# Patient Record
Sex: MALE | Race: WHITE | NOT HISPANIC OR LATINO | Employment: FULL TIME | URBAN - METROPOLITAN AREA
[De-identification: names, ages, dates, MRNs, and addresses within clinical notes are randomized per-mention and may not be internally consistent; named-entity substitution may affect disease eponyms.]

---

## 2023-04-24 LAB — HBA1C MFR BLD HPLC: 5 %

## 2024-03-05 ENCOUNTER — OFFICE VISIT (OUTPATIENT)
Dept: GASTROENTEROLOGY | Facility: CLINIC | Age: 59
End: 2024-03-05
Payer: COMMERCIAL

## 2024-03-05 VITALS
HEIGHT: 70 IN | DIASTOLIC BLOOD PRESSURE: 78 MMHG | BODY MASS INDEX: 25.48 KG/M2 | WEIGHT: 178 LBS | SYSTOLIC BLOOD PRESSURE: 122 MMHG

## 2024-03-05 DIAGNOSIS — Z12.11 COLON CANCER SCREENING: ICD-10-CM

## 2024-03-05 DIAGNOSIS — Z91.09 SENSITIVITY TO SUNLIGHT: ICD-10-CM

## 2024-03-05 DIAGNOSIS — K50.80 CROHN'S DISEASE OF BOTH SMALL AND LARGE INTESTINE WITHOUT COMPLICATION (HCC): Primary | ICD-10-CM

## 2024-03-05 DIAGNOSIS — Z86.19 HISTORY OF STAPH INFECTION: ICD-10-CM

## 2024-03-05 DIAGNOSIS — K21.9 GASTROESOPHAGEAL REFLUX DISEASE, UNSPECIFIED WHETHER ESOPHAGITIS PRESENT: ICD-10-CM

## 2024-03-05 DIAGNOSIS — Z85.828 HISTORY OF BASAL CELL CANCER: ICD-10-CM

## 2024-03-05 PROCEDURE — 99204 OFFICE O/P NEW MOD 45 MIN: CPT | Performed by: INTERNAL MEDICINE

## 2024-03-05 RX ORDER — ADALIMUMAB 40MG/0.4ML
KIT SUBCUTANEOUS
COMMUNITY
Start: 2024-02-13

## 2024-03-05 RX ORDER — OMEPRAZOLE 40 MG/1
CAPSULE, DELAYED RELEASE ORAL
COMMUNITY
Start: 2023-12-21

## 2024-03-05 RX ORDER — MELOXICAM 15 MG/1
15 TABLET ORAL DAILY
COMMUNITY
Start: 2023-12-05

## 2024-03-05 NOTE — PATIENT INSTRUCTIONS
[facility] - Outpatient Consultation  Johnathon Perera 58 y.o. male MRN: [id number]  Encounter: [id number]    ASSESSMENT AND PLAN:      1. Crohn's disease of both small and large intestine without complication (HCC)  -- Patient diagnosed with Crohn's disease in the last 2 to 3 years.  Sounds like he has had ileocolonic Crohn's.  Has had excellent response to Humira but has had some ongoing side effects  -Will continue Humira for now.  -In light of adverse effects consider changing to an alternative biologic such as Entyvio or Stelara.  -Will check old records and confer with Dr. Lr and Costa    --Patient does take Mobic occasionally for joint pains.  Best to take Tylenol and avoid NSAIDs    2. Colon cancer screening  --Up-to-date with colonoscopies.  Has had within the last year    3. Sensitivity to sunlight  --Patient noticed increasing sun sensitivity since being on Humira    4. History of staph infection  --Patient requiring antibiotics for skin infection in recent past.  Does have concerns about immunosuppressive effect of medication    5. History of basal cell cancer  -Recent Mohs surgery    6.  GERD-patient on omeprazole as needed.  Has had symptoms of dysphagia in the past.  Review records from previous endoscopy      Followup Appointment:  pending

## 2024-03-05 NOTE — PROGRESS NOTES
[facility] - Outpatient Consultation  Johnathon Perera 58 y.o. male MRN: [id number]  Encounter: [id number]    ASSESSMENT AND PLAN:      1. Crohn's disease of both small and large intestine without complication (HCC)  -- Patient diagnosed with Crohn's disease in the last 2 to 3 years.  Sounds like he has had ileocolonic Crohn's.  Has had excellent response to Humira but has had some ongoing side effects  -Will continue Humira for now.  -In light of adverse effects consider changing to an alternative biologic such as Entyvio or Stelara.  -Will check old records and confer with   and Costa    --Patient does take Mobic occasionally for joint pains.  Best to take Tylenol and avoid NSAIDs    2. Colon cancer screening  --Up-to-date with colonoscopies.  Has had within the last year    3. Sensitivity to sunlight  --Patient noticed increasing sun sensitivity since being on Humira    4. History of staph infection  --Patient requiring antibiotics for skin infection in recent past.  Does have concerns about immunosuppressive effect of medication    5. History of basal cell cancer  -Recent Mohs surgery    6.  GERD-patient on omeprazole as needed.  Has had symptoms of dysphagia in the past.  Review records from previous endoscopy      Followup Appointment:  pending ______________________________________________________________________    Chief Complaint   Patient presents with    Consult     New pt establish care, also discuss side affects from Humira       HPI:   Johnathon Perera is a 58 y.o. year old male who presents to the office seeking second opinion with respect to management of his Crohn's disease.  Patient began having symptoms of lower abdominal pain and diarrhea about 3 years ago.  It took him about 8 months but eventually was diagnosed with what sounds like ileocolonic Crohn's disease by colonoscopy.  Patient also had CAT scan examination.  He was initially treated with  "oral budesonide which was partially effective.  Eventually he was placed on Humira with good results with respect to his Crohn's.  He is not having bleeding diarrhea or abdominal pain.  He feels as though his Crohn's disease is \"in remission.\"  Patient does have concerns about effects of his medication.  In the past year he had a staph infection which required Lalan antibiotics.  This involves his face.  Patient also was diagnosed with basal cell CA and had recent Mohs surgery.  Lastly patient reports since being on Humira being increasingly sensitive to the sun.  He wants to know if there were alternatives to the Humira that may be as effective.  Patient is up-to-date with respect to screening.  He has had colonoscopies within the last year or so.  He does have occasional knee pain and joint pain.  He did have rashes some times predating his use of the marrow.  He does take meloxicam on occasion for his arthritis pains  Lastly patient does have a history of gastroesophageal reflux.  He is on omeprazole with good results.  He did have an upper endoscopy previously.  He has had previous history of some solid food dysphagia but this is infrequent.  Will have to be careful he eats things like dried chicken or spaghetti.    Historical Information   Past Medical History:   Diagnosis Date    Basal cell adenocarcinoma     Colon polyp     Crohn's disease (HCC) 10/22/2022    Staphylococcal infection      Past Surgical History:   Procedure Laterality Date    COLONOSCOPY      MOHS SURGERY       Social History     Substance and Sexual Activity   Alcohol Use Yes    Alcohol/week: 4.0 standard drinks of alcohol    Types: 4 Standard drinks or equivalent per week     Social History     Substance and Sexual Activity   Drug Use Never     Social History     Tobacco Use   Smoking Status Never   Smokeless Tobacco Never     Family History   Problem Relation Age of Onset    Diabetes Mother     COPD Father     Crohn's disease Maternal " "Grandmother         Zunilda       Meds/Allergies     Current Outpatient Medications:     Humira, 2 Pen, 40 MG/0.4ML PNKT    meloxicam (MOBIC) 15 mg tablet    omeprazole (PriLOSEC) 40 MG capsule    Allergies   Allergen Reactions    Penicillins Rash       PHYSICAL EXAM:    Blood pressure 122/78, height 5' 10\" (1.778 m), weight 80.7 kg (178 lb). Body mass index is 25.54 kg/m².  General Appearance: NAD, cooperative, alert  Eyes: Anicteric, conjunctiva pink   ENT:  Normocephalic, atraumatic, normal mucosa.    Neck:  Supple, symmetrical, trachea midline,   Resp:  Clear to auscultation bilaterally; no rales, rhonchi or wheezing; respirations unlabored   CV:  S1 S2, Regular rate and rhythm; no murmur, rub, or gallop.  GI:  Soft, non-tender, non-distended; normal bowel sounds; no masses, no organomegaly   Rectal: Deferred  Musculoskeletal: No cyanosis, clubbing or edema. Normal ROM.  Skin:  No jaundice, rashes, or lesions   Heme/Lymph: No palpable cervical lymphadenopathy  Psych: Normal affect, good eye contact  Neuro: No gross deficits, AAOx3      Lab studies [facility] August 2023 CBC, CBC, CRP all normal.  -Patient reports \"good\" adalimumab levels on recent labs    REVIEW OF SYSTEMS:    CONSTITUTIONAL: Denies any fever, chills, rigors, and weight loss.  HEENT: No earache or tinnitus. Denies hearing loss or visual disturbances.  CARDIOVASCULAR: No chest pain or palpitations.   RESPIRATORY: Denies any cough, hemoptysis, shortness of breath or dyspnea on exertion.  GASTROINTESTINAL: As noted in the History of Present Illness.   GENITOURINARY: No problems with urination. Denies any hematuria or dysuria.  NEUROLOGIC: No dizziness or vertigo, denies headaches.   MUSCULOSKELETAL: Denies any muscle or joint pain.   SKIN: Denies skin rashes or itching.   ENDOCRINE: Denies excessive thirst. Denies intolerance to heat or cold.  PSYCHOSOCIAL: Denies depression or anxiety. Denies any recent memory loss.     "

## 2024-04-10 ENCOUNTER — TELEPHONE (OUTPATIENT)
Dept: GASTROENTEROLOGY | Facility: CLINIC | Age: 59
End: 2024-04-10

## 2024-04-10 NOTE — TELEPHONE ENCOUNTER
My apologies for not getting back to you --  I was waiting for your records to get scanned into your chart so I could review before calling my colleague so I could have all the information available.  Unfortunately I never received those records-I believe I asked the  to have you sign for them so we can sign for the records if not we can do this at this time.  So sorry for the mixup--pretty important issue as more lore days are in the near future- will try to correct -Do you remember signing a record release at the office ?  Will ask staff to ask for  records for [facility] / Dr. Rivera and procedure and path notes      AILYN

## 2024-04-15 ENCOUNTER — TELEPHONE (OUTPATIENT)
Dept: GASTROENTEROLOGY | Facility: CLINIC | Age: 59
End: 2024-04-15

## 2024-04-15 NOTE — TELEPHONE ENCOUNTER
----- Message from Demarcus Beebe MD sent at 4/15/2024 10:21 AM EDT -----  Regarding: med records for [facility] and [facility]     I am not sure who to send this to but can someone get these records for me soon for me to review--  thanks so much    AILYN

## 2024-04-15 NOTE — TELEPHONE ENCOUNTER
Lvm for [facility] office to call with information about obtaining pt's records for Dr Beebe's review

## 2024-04-17 NOTE — TELEPHONE ENCOUNTER
Maritza from [facility] called. Call was transferred to [facility].

## 2024-04-17 NOTE — TELEPHONE ENCOUNTER
Dr Nunez was finally able to get the pt's medical records from Dr Rivera's office. They are scanned in for your review under the Media tab   No

## 2024-05-08 ENCOUNTER — HOSPITAL ENCOUNTER (OUTPATIENT)
Dept: HOSPITAL 99 - RAD | Age: 59
End: 2024-05-08
Payer: COMMERCIAL

## 2024-05-08 DIAGNOSIS — M54.42: Primary | ICD-10-CM

## 2024-05-21 ENCOUNTER — CONSULT (OUTPATIENT)
Dept: GASTROENTEROLOGY | Facility: CLINIC | Age: 59
End: 2024-05-21
Payer: COMMERCIAL

## 2024-05-21 VITALS
HEIGHT: 70 IN | WEIGHT: 175 LBS | DIASTOLIC BLOOD PRESSURE: 75 MMHG | BODY MASS INDEX: 25.05 KG/M2 | TEMPERATURE: 98.2 F | HEART RATE: 50 BPM | OXYGEN SATURATION: 99 % | SYSTOLIC BLOOD PRESSURE: 131 MMHG

## 2024-05-21 DIAGNOSIS — K50.80 CROHN'S DISEASE OF BOTH SMALL AND LARGE INTESTINE WITHOUT COMPLICATION (HCC): Primary | ICD-10-CM

## 2024-05-21 PROBLEM — K52.9 IBD (INFLAMMATORY BOWEL DISEASE): Status: ACTIVE | Noted: 2024-05-21

## 2024-05-21 PROCEDURE — 99215 OFFICE O/P EST HI 40 MIN: CPT | Performed by: INTERNAL MEDICINE

## 2024-05-21 RX ORDER — TADALAFIL 5 MG/1
TABLET ORAL
COMMUNITY
Start: 2024-03-21

## 2024-05-21 NOTE — PROGRESS NOTES
Gastroenterology Outpatient Follow-up - Crohn's Disease  Johnathon Perera 58 y.o. male MRN: [id number]  Encounter: [id number]    Johnathon Perera is a 58 y.o. male with Ulcerative colitis.    Symptom onset:  2020  Diagnosis:  ulcerative colitis  Year of diagnosis:  2021    IBD Summary: Johnathon Perera has pancolitis diagnosed in 2020.  Initial diagnosis was Crohn's disease, but I think that he may have UC.  He has been in clinical and endoscopic remission with adalimumab for about 2 years.  He has history of basal cell carcinoma.  He is seeing me in 2024 for second opinion.      Ulcerative Colitis Summary  Macroscopic extent of diseases: extensive ulcerative colitis  Microscopic extent of diseases:  pancolitis  Has the patient ever been hospitalized for severe disease: No        Surgical History  Number of IBD surgeries: 0      First IBD surgery:    Most Recent IBD surgery:    Esophageal:  0    Gastroduodenal:  0    Small bowel resection(s):  0    Ileocolonic resection(s): 0      Colonic resection(s):  0    Ileostomy or colostomy:  no previous ostomy    Complete colectomy:  No         Medications     Year last used Reason for discontinuation   Corticosteroids prior   2021 [place]     Thiopurines   never         Methotrexate   never         Infliximab   never         Adalimumab prior     CR     Certolizumab   never         Golimumab   never         Natalizumab   never         Mesalamine   never         Sulfasalzine   never         Vedolizumab   never         Ustekinumab   never         Tofacitinib   never         Other biologic   never             Extraintestinal Manifestations    IBD-associated arthropathy Yes  Joint pain at time of diagnosis    Uveitis      Oral aphthous ulcers No   Erythema nodosum No    Pyoderma gangrenosum No    Primary sclerosing cholangitis No    Thrombotic complications No          Cancer / Dysplasia History  History of IBD-associated dysplasia: none    Date of diagnosis (Year):     History of  colorectal cancer: No   History of cervical dysplasia: No   History of skin cancer: basal cell carcinoma         Laboratory Data   Most recent (date) Result   PPD       Quanitferon gold 8/4/2023 negative   TPMT   unknown   Hepatitis A   unknown   HBsAb 8/4/2023 negative   HBcAb 8/4/2023 negative   HBsAg 8/4/2023 negative   HCV Ab   unknown       Imaging / Diagnostic Procedures   Most recent (date) Findings   Colonoscopy or sigmoidoscopy #1 5/28/2021            Ulcers/Erosions  small           Strictures  none           Stricture Severity  N/A           Endoscopic Score Terrazas Score:  2 Rutgeert's:               Findings  1.  5 mm sessile polyp in the rectum.  2.  Normal mucosa in the proximal ascending colon and cecum.  3.  Inflammation characterized by edema, erythema, granularity, and aphthous ulcers were found in continuous and circumferential pattern from the sigmoid colon to the ascending colon.  This was moderate in severity.  4.  The rectum, the rectosigmoid colon and distal sigmoid colon the proximal ascending colon and the cecum were spared.           Pathology  A.  Polyp: Tubular adenoma.  B.  TI: Mild focally active ileitis.  Negative for granulomas or dysplasia.  C.  70 cm: Mild to moderate chronic active colitis.   D.  60 cm: Mild chronic active colitis.  E.  50 cm: Moderate chronic active colitis.  F.   40 cm: Mild chronic active colitis.  G.  30 cm: Mild acute colitis.  H.  20 cm: Focal mild increased chronic inflammation.  I.   10 cm colon colon focal mild acute colitis.   Colonoscopy or sigmoidoscopy #2 2/21/2022            Ulcers/Erosions  no erosions              Strictures  none              Stricture Severity  N/A           Endoscopic Score Terrazas Score:  0 Rugeert's:  N/A           Findings  1.  7 mm sessile polyp in the transverse colon.  2.  Normal mucosa in the entire colon.  Biopsies taken.  3. Normal terminal ileum.  Biopsies taken.  PATH:  A.  Polyp: polypoid low-grade dysplasia.  B.  TI:  Focal cryptitis.  C.  Colon: Quiescent colitis, negative for dysplasia.           Pathology      EGD       Small bowel follow-through       CT enterography       CT without enterography       MRI enterography       Capsule study       DEXA scan   Lowest Z-score:      CXR (for TB)           HPI:   Interval History:  5/20/2024 Follow up  I am seeing Johnathon Dunbarradha for 2nd opinion.  He was diagnosed with ileocolonic Crohn's disease in 5/2021.  His symptoms started in 2020 and consisted of lower abdominal pain, loose stools, and bleeding. He recalls that his symptoms started shortly after he was diagnosed with COVID.  His colonoscopy from May 2021 showed normal mucosa in the right colon and normal mucosa in the rectum, with inflammation of the sigmoid and transverse and ascending colon.  However, biopsies showed pancolitis.  The TI appeared normal but there was mild active ileitis on biopsies.  He was treated with prednisone and improved, then took budesonide but flaed again. Adalimumab was started in the Fall of 2021 and he has been on remission since with standard dosing.  Repeat colonoscopy in 2/2022 showed endoscopic remission.     For the most, he is happy with adalimumab, but wants to discuss some concerns. First, about a year ago he developed a macular rash on the face and saw a dermatologist. He says that skin swab was positive for MRSA and he was treated with an antibiotic and the rash resolved.  Second, 6 months ago, he was diagnosed with basal cell carcinoma on the face and had Mohs surgery.  Third, he feels that the skin on his face has become photosensitive and turns red when he is out in the sun, then becomes normal again.  He wants to know whether adalimumab might be responsible for these skin issues.      He reports that, until a week ago, his bowel function was at baseline.  However, approximately a week ago he noticed some loose stools.  Frequency has increased from 1-3 bowel movements per day.  There  "is no diarrhea and there is no bleeding.  He says that with the soft stools, he feels that he is BMs are incomplete.    He does not get COVID or flu shots.  He has chronic shoulder, elbow, and knee pain.  He cannot recall the timing of this, but thinks that it started around the same time as his IBD diagnosis.  He has not in rheumatology.    Labs from 5/9/24:  Cr 1.23, LFTs normal, Hgb 14.3, MCV 87.9, Plt 234    Johnathon reports the following symptoms over the last 7 days:    Stool Frequency 1-2 stools/day more than normal   Average stools per day: 3   Average liquid stools per day: 0   Consistency of bowel: soft or semi-formed   Awakening from sleep to move bowels? No   Urgency no fecal urgency   Visible blood in stool? none   Abdominal pain? none   General Wellbeing? generally well     Johnathon also reports the following symptoms in the last month:    Leakage of stool while sleeping? No   Leakage of stool while awake? No       REVIEW OF SYSTEMS:  During the last 7 days, Johnathon experienced the following symptoms:  Unintentional Weight Loss (in the last month) No   Fever No  Comment:night sweats   Eye irritation No   Mouth sores No   Sore throat No   Chest pain No   Shortness of breath No   Numbness or tingling in hands or feet No   Skin rash No   Pain or swelling in joints Yes   Bruising or bleeding No   Felt depressed or blue No   Fatigue Yes   Dysuria No   Please see HPI for additional pertinent review of systems; otherwise remainder of ROS was unremarkable    MEDICATIONS:    Current Outpatient Medications:     Humira, 2 Pen, 40 MG/0.4ML PNKT    meloxicam (MOBIC) 15 mg tablet    omeprazole (PriLOSEC) 40 MG capsule    tadalafil (CIALIS) 5 MG tablet    ALLERGIES:  Allergies   Allergen Reactions    Penicillins Rash       OBJECTIVE:  /75 (BP Location: Left arm, Patient Position: Sitting, Cuff Size: Large)   Pulse (!) 50   Temp 98.2 °F (36.8 °C) (Tympanic)   Ht 5' 10\" (1.778 m)   Wt 79.4 kg (175 lb)   SpO2 " "99%   BMI 25.11 kg/m²      PHYSICAL EXAM:    General Appearance:   Alert, cooperative, no distress   HEENT:   Normocephalic, atraumatic, anicteric.     Neck:  Supple, symmetrical, trachea midline   Lungs:   Clear to auscultation bilaterally; no rales, rhonchi or wheezing; respirations unlabored    Heart::   Regular rate and rhythm; no murmur, rub, or gallop.   Abdomen:   Soft, non-distended; normal bowel sounds    Abdominal exam was notable for no tenderness with no mass.     Genitalia:   Deferred    Rectal:   Perirectal assessment was not performed.                     Extremities:  No cyanosis, clubbing or edema    Pulses:  2+ and symmetric    Skin:  No jaundice, rashes, or lesions    Lymph nodes:  No palpable cervical lymphadenopathy      No results found for: \"WBC\", \"HGB\", \"HCT\", \"MCV\", \"PLT\"  No results found for: \"NA\", \"SODIUM\", \"K\", \"CL\", \"CO2\", \"ANIONGAP\", \"AGAP\", \"BUN\", \"CREATININE\", \"GLUC\", \"GLUF\", \"CALCIUM\", \"AST\", \"ALT\", \"ALKPHOS\", \"PROT\", \"TP\", \"BILITOT\", \"TBILI\", \"EGFR\"  No results found for: \"CRP\"  No results found for: \"ODTPOZFM54\"  No results found for: \"FERRITIN\"    ASSESSMENT AND PLAN:    Johnathon has a history of macroscopic extensive  and microscopic pancolitis  ulcerative colitis diagnosed in 2021. Current medical therapy is with adalimumab 40 mg every 2 weeks. My global assessment is that the clinical disease is currently quiescent.     The 6-point Terrazas score was 1 and the 9-point Terrazas score was 1.  The short CDAI was 44.      Johnathon Perera has a diagnosis of ileocolonic Crohn's disease. His initial colonoscopy from 2021 showed segmental colitis, but he had pan-colitis histologically. Therefore, I wonder whether he has UC with backwash ileitis.  In any case, he is doing well on adalimumab currently and is in clinical and endoscopic remission.     We discussed his concerns re. adalimumab and skin issues, specifically staph infection, basal cell carcinoma, and sun sensitivity. To my knowledge, " these are not typical side effects of adalimumab. The staph infection, in particular, sounds strange because he describes a uniform macular rash, rather than pustules as would be expected for impetigo. I wonder whether the skin culture simply grew out normal benjamín. Re. basal cell carcinoma, there is no data to suggest that adalimumab increases the risk.  Re. sun sensitivity, I have not encountered this before, but I cannot completely rule out a medication side effect.    My advice is to stick with adalimumab for now and discuss the skin concerns with his dermatologist.  I explained that we can easily switch to a different biologic, but there is always the risk that the next biologic may not work as well as adalimumab.    1. He will stay on adalimumab 40 mg every 2 weeks for now.  2. Given loose stools for the past week, check calprotectin.  3. Regular Dermatology follow up.  4. Vaccinations as discussed below.  5. I am happy to see him again and readdress adalimumab concerns.        Health Maintenance Recommendations:  Vaccines & Infections  COVID-19 vaccination and boosters are recommended. There is no evidence that the COVID-19 vaccine would cause an IBD flare.  Avoid live vaccines if on immunosuppressive therapy.  Yearly influenza vaccine (flu shot).  Pneumonia vaccines for patients on immunosuppression. These include Prevnar 20, followed by Pneumovax 23 at least 8 weeks later.  Shingrix vaccine (series of 2 injections) for al patients 65 and older. Patients on tofacitinib or upadacitinib should be vaccinated regardless of age.  If not immune to measles mumps or rubella, MMR vaccine is recommended. However, this is a live vaccine and should be given prior to immunosuppressive therapy.  HPV vaccination as per national guidelines.  Hepatitis A and B vaccinations if not previously vaccinated.  Testing for tuberculosis with QuantiFERON Gold blood test and/or chest xray prior to starting immunosuppressive medications,  and then annually    Cancer screening  Dysplasia surveillance for colorectal cancer. Colonoscopy in all patients with extensive colitis (more than 1/3 of the colon involved) who had disease for at least 8 or more years.  Repeat colonoscopy approximately every 12-24 months.  In patients with concurrent primary sclerosing cholangitis, history of dysplasia, or family history of colon cancer, repeat colonoscopy annually.    Females: Pap smear annually for woman on immunosuppression.  Annual dermatologic/skin exam in all patients with IBD, especially those on immunosuppression with thiopurines or MARY KAY inhibitors.    Miscellaneous  DEXA scan, once off steroids for 3 months  Depression screening recommended annually  Routine dental and ophthalmology examinations    Problem List Items Addressed This Visit    None  Visit Diagnoses       Crohn's disease of both small and large intestine without complication (HCC)    -  Primary    Relevant Orders    Calprotectin,Fecal            Vesselin MD Costa

## 2024-05-21 NOTE — PATIENT INSTRUCTIONS
High Fiber Diet   WHAT YOU NEED TO KNOW:   A high-fiber diet includes foods that have a high amount of fiber. Fiber is the part of fruits, vegetables, and grains that is not broken down by your body. Fiber keeps your bowel movements regular. Fiber can also help lower your cholesterol level, control blood sugar in people with diabetes, and relieve constipation. Fiber can also help you control your weight because it helps you feel full faster. Most adults should eat 25 to 35 grams of fiber each day. Talk to your dietitian or healthcare provider about the amount of fiber you need.  DISCHARGE INSTRUCTIONS:   Good sources of fiber:       Foods with at least 4 grams of fiber per serving:      ? to ½ cup of high-fiber cereal (check the nutrition label on the box)    ½ cup of blackberries or raspberries    4 dried prunes    1 cooked artichoke    ½ cup of cooked legumes, such as lentils, or red, kidney, and claudio beans    Foods with 1 to 3 grams of fiber per servin slice of whole-wheat, pumpernickel, or rye bread    ½ cup of cooked brown rice    4 whole-wheat crackers    1 cup of oatmeal    ½ cup of cereal with 1 to 3 grams of fiber per serving (check the nutrition label on the box)    1 small piece of fruit, such as an apple, banana, pear, kiwi, or orange    3 dates    ½ cup of canned apricots, fruit cocktail, peaches, or pears    ½ cup of raw or cooked vegetables, such as carrots, cauliflower, cabbage, spinach, squash, or corn  Ways that you can increase fiber in your diet:   Choose brown or wild rice instead of white rice.     Use whole wheat flour in recipes instead of white or all-purpose flour.     Add beans and peas to casseroles or soups.     Choose fresh fruit and vegetables with peels or skins on instead of juices.    Other diet guidelines to follow:   Add fiber to your diet slowly.  You may have abdominal discomfort, bloating, and gas if you add fiber to your diet too quickly.     Drink plenty of liquids  as you add fiber to your diet.  You may have nausea or develop constipation if you do not drink enough water. Ask how much liquid to drink each day and which liquids are best for you.    © Copyright [employer]ative 2023 Information is for End User's use only and may not be sold, redistributed or otherwise used for commercial purposes.  The above information is an  only. It is not intended as medical advice for individual conditions or treatments. Talk to your doctor, nurse or pharmacist before following any medical regimen to see if it is safe and effective for you.    Johnathon Perera  5/21/2024     Recommended Total Fiber Intake**    AGE  MEN  WOMAN    19-50  38 grams/day  25 grams/day    Over 50  30 grams/day  21 grams/day    Fiber Sources in Common Foods   Use this guide to find out if you have enough fiber in you diet.    Food  Size of Serving  Fiber Grams/Servings  Calories/   Serving  Food  Size of Serving  Fiber Grams/Servings  Calories/   Serving    Fruits: (raw unless otherwise noted  Vegetables: (cooked, unless otherwise noted)    Apple (w/peel)  1 medium  3.7  81  Artichoke  1 globe  6.5  60    Apricots  1 cup  3.7  74  Asparagus  ½ cup  1.8  25    Banana  1 medium  2.7  105  Beans:    Blackberries  1 cup  7.2  75  Green (canned)  ½ cup  1.3  14    Blueberries  1 cup  3.9  81  Kidney  ½ cup  5.7  114    Cantaloupe  1 cup  1.3  56  Lima  ½ cup  6.1  85    Grapefruit  1 medium  2.8  82  Daniels  ½ cup  7.4  118    Grapes  1 cup  1.6  114  White  ½ cup  5.5  122    Orange  1 medium  3.1  62  Beets  ½ cup  1.6  37    Pear (with peel)  1 medium  4.0  98  Broccoli  ½ cup  2.8  26    Pineapple  1 cup  1.9  76  Cabbage, green  ½ cup  2.1  16    Plums  1 medium  1.0  36  Cabbage, green (raw)  ½ cup  0.8  9    Prunes (dried)  1 cup  11.4  386  Carrots  ½ cup  2.6  35    Raspberries  1 cup  8.4  60  Cauliflower  ½ cup  2.0  17    Strawberries  1 cup  3.4  45  Cauliflower (raw)  ½ cup  1.3  13    Watermelon  1  slice  0.8  51  Celery (raw)  ½ cup  1.0  10    GRAIN PRODUCTS AND OTHERS:  Corn  ½ cup  2.0  66    Bread:  Cucumber (raw)  ½ cup  0.4  7    Kiswahili  1 slice  0.8  68  Eggplant  ½ cup  1.2  13    Rye  1 slice  1.6  67  Green Peas  ½ cup  4.4  62    White  1 slice  0.6  67  Lettuce, iceberg (raw)  ½ cup  0.4  4    Whole Wheat  1 slice  2.0  70  Onions (raw)  ½ cup  1.4  30    Cereal:  Potato (baked with skin)  ½ cup  1.5  66    Bran  1 ounce  9.7  70  Spinach  ½ cup  2.7  25    Corn Flakes  1 ounce  1.0  110  Tomato  ½ cup  1.0  19    Oat Bran  1 ounce  4.3  69  Zucchini  ½ cup  1.3  14    Oatmeal  1 ounce  3.0  109  METAMUCIL:    Shredded Wheat  1 ounce  2.8  102  Capsules  6 capsules  3.0  10    Crackers:  Smooth Texture Orange (sugar free)  1 tsp  3.0  20    Ken  1 square  0.1  27  Smooth Texture Orange (with sugar)  1 tbsp  3.0  45    Saltine  1 regular  0.1  13  Wafers  2 wafers  3.0  120    Rice:    Brown  ½ cup  1.8  108    White  ½ cup  0.3  103    Spaghetti  2 ounces  2.1  225    Almonds (roasted)  ½ cup  6.4  351    Peanuts (roasted)  ½ cup  6.1  388      ** [place] [employer], [employer] Sciences, 2002   Track your fiber intake for five days. Use the Fiber Source Guide to find out how much fiber is in common food.     If you’re not getting your recommended amount of fiber each day, talk to your doctor about how you can increase the fiber in your diet. Example  Monday Tuesday Wednesday Thursday Friday    Food  Oatmeal    Fiber Grams  2.8    Food  Blueberries    Fiber Grams  3.9    Food  W.W. Bread    Fiber Grams  1.9    Food  W.W. Bread    Fiber Grams  1.9    Food  Apple    Fiber Grams  3.7    Food  Spaghetti    Fiber Grams  .14    Food  Corn    Fiber Grams  2.0    Food  White Bread    Fiber Grams  .6    Food    Fiber Grams    Food    Fiber Grams    Food    Fiber Grams    Food    Fiber Grams    Food    Fiber Grams    Food    Fiber Grams    Food    Fiber Grams    Food    Fiber Grams     Food    Fiber Grams    Food    Fiber Grams    Food    Fiber Grams    Food    Fiber Grams    Add numbers in each column to find your daily fiber intake.    Total Daily Fiber Intake  18.2      Too Low - Like most Americans, this example is not enough fiber. Talk to your doctor about how to add fiber to your diet.   Quick Fiber Facts    Most Americans consume only about half of the recommended fiber they need each day.    Fiber helps maintain normal bowel function, and helps prevent constipation and its potential complications. Straining and pressure from constipation may lead to diverticular disease and hemorrhoids.    Stool softeners or stimulant laxatives only offer short-term relief of constipation, while dietary changes or fiber therapies help break the cycle of irregularity.    Diets low in saturated fat and cholesterol that include 7 grams of soluble fiber per day from psyllium husk, as in Metamucil, may reduce the risk of heart disease by lowering cholesterol. One adult dose of Metamucil has 2.4 grams of this soluable fiber.    Increase fiber intake gradually, giving the body time to adjust.

## 2024-06-14 LAB — CALPROTECTIN STL-MCNT: 44 MCG/G

## 2024-07-30 DIAGNOSIS — K21.9 GASTROESOPHAGEAL REFLUX DISEASE, UNSPECIFIED WHETHER ESOPHAGITIS PRESENT: Primary | ICD-10-CM

## 2024-07-30 RX ORDER — OMEPRAZOLE 40 MG/1
40 CAPSULE, DELAYED RELEASE ORAL DAILY
Qty: 30 CAPSULE | Refills: 3 | Status: SHIPPED | OUTPATIENT
Start: 2024-07-30

## 2024-08-14 DIAGNOSIS — K50.80 CROHN'S DISEASE OF BOTH SMALL AND LARGE INTESTINE WITHOUT COMPLICATION (HCC): Primary | ICD-10-CM

## 2024-08-14 NOTE — TELEPHONE ENCOUNTER
"Johnathon Perera   to [facility] (supporting Demarcus Beebe MD)   RD      8/10/24 10:36 AM  Ajay Beebe,      I am in need of a prescription refill for \"Humira (2 Pen) 40 MG/0.4ML Pnkt\". I took my last dose today. The Pharmacy, [facility], says that cannot send my next dose without a prescription update.      Thank you,      Minor Perera  August 12, 2024       CT    8/12/24  8:48 AM  Makenna Morgan MA routed this conversation to [facility]  Lizzie Musa, JENNIFER   to Me       8/12/24  9:01 AM  Looks as this would be his first refill with our GI group. Do we need a new authorization?  "

## 2024-08-15 NOTE — TELEPHONE ENCOUNTER
Medication: Humira 40mg Pen  Directions: inject 40mg every other week  Quantity: 2 pens  Day Supply: 28   Insurance: [employer]  How Prior Auth was submitted: Noel  Authorization Date range: 7/15/2024 - 8/14/2025  Authorization Number: [id number]  Pharmacy that fills med: [facility]

## 2024-08-16 NOTE — TELEPHONE ENCOUNTER
Called patient, reached voicemail, left message our office has sent a Socket Mobilehart message relaying pts requested medication refill has been sent to his pharmacy.

## 2024-08-19 NOTE — TELEPHONE ENCOUNTER
I spoke with the patient. Patients states he has spoken with [facility] and confirmed they received Humira prescription and pt requested medication to be expedited as [facility] stated it could take 5 days to process. Relayed to the patient, our office will also call [facility] to request expedited processing.     I called [facility] and spoke with Bob and requested Humira 40 mg/0.4ml prescription to be expedited. Bob received a reject product order service and has sent message to eligibility team to double check the PA on file. Provided Bob with PA Authorization Number: [id number]. Bob states submitted expedition to review and could take 2-3 business days to review.

## 2024-08-21 NOTE — TELEPHONE ENCOUNTER
Called [facility] and spoke with Shahla. This is in the final stages of processing. She is submitted request to expedite again.     Also, per [facility] it will be Adalimumab - RYESME (Simlandi) it is interchangeable with HumiraKaren Shabazz - can you please have pt follow up with [facility] tomorrow and let us know if he still cannot get medication. Also can you educate on Simlandi

## 2024-08-22 NOTE — TELEPHONE ENCOUNTER
I spoke with the patient. Patient aware to call [facility] today to set up shipment for Simlandi- discussed biosimilar Simlandi. Pt aware to call our office back if [facility] is unable to ship Simlandi.

## 2024-09-19 ENCOUNTER — OFFICE VISIT (OUTPATIENT)
Dept: GASTROENTEROLOGY | Facility: CLINIC | Age: 59
End: 2024-09-19
Payer: COMMERCIAL

## 2024-09-19 ENCOUNTER — TELEPHONE (OUTPATIENT)
Dept: GASTROENTEROLOGY | Facility: CLINIC | Age: 59
End: 2024-09-19

## 2024-09-19 VITALS
WEIGHT: 177 LBS | HEIGHT: 70 IN | DIASTOLIC BLOOD PRESSURE: 68 MMHG | SYSTOLIC BLOOD PRESSURE: 100 MMHG | BODY MASS INDEX: 25.34 KG/M2

## 2024-09-19 DIAGNOSIS — Z91.09 SENSITIVITY TO SUNLIGHT: ICD-10-CM

## 2024-09-19 DIAGNOSIS — Z85.828 HISTORY OF BASAL CELL CANCER: ICD-10-CM

## 2024-09-19 DIAGNOSIS — K50.80 CROHN'S DISEASE OF BOTH SMALL AND LARGE INTESTINE WITHOUT COMPLICATION (HCC): Primary | ICD-10-CM

## 2024-09-19 DIAGNOSIS — Z79.899 IMMUNOSUPPRESSION DUE TO DRUG THERAPY  (HCC): ICD-10-CM

## 2024-09-19 DIAGNOSIS — D84.821 IMMUNOSUPPRESSION DUE TO DRUG THERAPY  (HCC): ICD-10-CM

## 2024-09-19 DIAGNOSIS — D12.6 COLON ADENOMA: ICD-10-CM

## 2024-09-19 PROCEDURE — 99214 OFFICE O/P EST MOD 30 MIN: CPT | Performed by: INTERNAL MEDICINE

## 2024-09-19 NOTE — TELEPHONE ENCOUNTER
Scheduled date of colonoscopy (as of today):10-  Physician performing colonoscopy:Costa  Location of colonoscopy:[facility]   Bowel prep reviewed with patient:Aditya Crespo  Instructions reviewed with patient by:arvin  Clearances: no

## 2024-09-19 NOTE — PROGRESS NOTES
Gastroenterology Outpatient Follow-up - Ulcerative Colitis  Johnathon Perera 59 y.o. male MRN: [id number]  Encounter: [id number]    Johnathon Perera is a 59 y.o. male with Ulcerative colitis.    Symptom onset:  2020  Diagnosis:  ulcerative colitis  Year of diagnosis:  2021    IBD Summary: Johnathon Perera has pancolitis diagnosed in 2020.  Initial diagnosis was Crohn's disease, but I think that he may have UC.  He has been in clinical and endoscopic remission with adalimumab for about 2 years.  He has history of basal cell carcinoma.  He is seeing me in 2024 for second opinion.      Ulcerative Colitis Summary  Macroscopic extent of diseases: extensive ulcerative colitis  Microscopic extent of diseases:  pancolitis  Has the patient ever been hospitalized for severe disease: No        Surgical History  Number of IBD surgeries: 0      First IBD surgery:    Most Recent IBD surgery:    Esophageal:  0    Gastroduodenal:  0    Small bowel resection(s):  0    Ileocolonic resection(s): 0      Colonic resection(s):  0    Ileostomy or colostomy:  no previous ostomy    Complete colectomy:  No         Medications     Year last used Reason for discontinuation   Corticosteroids prior   2021 CO     Thiopurines   never         Methotrexate   never         Infliximab   never         Adalimumab prior     CR     Certolizumab   never         Golimumab   never         Natalizumab   never         Mesalamine   never         Sulfasalzine   never         Vedolizumab   never         Ustekinumab   never         Tofacitinib   never         Other biologic   never             Extraintestinal Manifestations    IBD-associated arthropathy Yes  Joint pain at time of diagnosis    Uveitis      Oral aphthous ulcers No   Erythema nodosum No    Pyoderma gangrenosum No    Primary sclerosing cholangitis No    Thrombotic complications No          Cancer / Dysplasia History  History of IBD-associated dysplasia: none    Date of diagnosis (Year):     History of  colorectal cancer: No   History of cervical dysplasia: No   History of skin cancer: basal cell carcinoma         Laboratory Data   Most recent (date) Result   PPD       Quanitferon gold 8/4/2023 negative   TPMT   unknown   Hepatitis A   unknown   HBsAb 8/4/2023 negative   HBcAb 8/4/2023 negative   HBsAg 8/4/2023 negative   HCV Ab   unknown       Imaging / Diagnostic Procedures   Most recent (date) Findings   Colonoscopy or sigmoidoscopy #1 5/28/2021            Ulcers/Erosions  small           Strictures  none           Stricture Severity  N/A           Endoscopic Score Terrazas Score:  2 Rutgeert's:               Findings  1.  5 mm sessile polyp in the rectum.  2.  Normal mucosa in the proximal ascending colon and cecum.  3.  Inflammation characterized by edema, erythema, granularity, and aphthous ulcers were found in continuous and circumferential pattern from the sigmoid colon to the ascending colon.  This was moderate in severity.  4.  The rectum, the rectosigmoid colon and distal sigmoid colon the proximal ascending colon and the cecum were spared.           Pathology  A.  Polyp: Tubular adenoma.  B.  TI: Mild focally active ileitis.  Negative for granulomas or dysplasia.  C.  70 cm: Mild to moderate chronic active colitis.   D.  60 cm: Mild chronic active colitis.  E.  50 cm: Moderate chronic active colitis.  F.   40 cm: Mild chronic active colitis.  G.  30 cm: Mild acute colitis.  H.  20 cm: Focal mild increased chronic inflammation.  I.   10 cm colon colon focal mild acute colitis.   Colonoscopy or sigmoidoscopy #2 2/21/2022            Ulcers/Erosions  no erosions              Strictures  none              Stricture Severity  N/A           Endoscopic Score Terrazas Score:  0 Rugeert's:  N/A           Findings  1.  7 mm sessile polyp in the transverse colon.  2.  Normal mucosa in the entire colon.  Biopsies taken.  3. Normal terminal ileum.  Biopsies taken.  PATH:  A.  Polyp: polypoid low-grade dysplasia.  B.  TI:  Focal cryptitis.  C.  Colon: Quiescent colitis, negative for dysplasia.           Pathology      EGD       Small bowel follow-through       CT enterography       CT without enterography       MRI enterography       Capsule study       DEXA scan   Lowest Z-score:      CXR (for TB)           HPI:   Interval History:  9/19/2024 FOllow up  He is doing well and remains on adalimumab every 2 weeks. Bowel function is normal.  Rarely takes meloxicam. No flu or COVID vaccines. Got Shinrix. Sees Derm yearly for h/o basal cell carcinoma. Still dealing with dry skin. Labs at [facility] on 8/28/24: Cr 1.23, LFTs normal, Hgb 14, MCV 88. Last colonoscopy was in 2022 and he had a 7 mm adenoma.    5/20/2024 Follow up  I am seeing Johnathon Perera for 2nd opinion.  He was diagnosed with ileocolonic Crohn's disease in 5/2021.  His symptoms started in 2020 and consisted of lower abdominal pain, loose stools, and bleeding. He recalls that his symptoms started shortly after he was diagnosed with COVID.  His colonoscopy from May 2021 showed normal mucosa in the right colon and normal mucosa in the rectum, with inflammation of the sigmoid and transverse and ascending colon.  However, biopsies showed pancolitis.  The TI appeared normal but there was mild active ileitis on biopsies.  He was treated with prednisone and improved, then took budesonide but flaed again. Adalimumab was started in the Fall of 2021 and he has been on remission since with standard dosing.  Repeat colonoscopy in 2/2022 showed endoscopic remission.     For the most, he is happy with adalimumab, but wants to discuss some concerns. First, about a year ago he developed a macular rash on the face and saw a dermatologist. He says that skin swab was positive for MRSA and he was treated with an antibiotic and the rash resolved.  Second, 6 months ago, he was diagnosed with basal cell carcinoma on the face and had Mohs surgery.  Third, he feels that the skin on his face has become  photosensitive and turns red when he is out in the sun, then becomes normal again.  He wants to know whether adalimumab might be responsible for these skin issues.      He reports that, until a week ago, his bowel function was at baseline.  However, approximately a week ago he noticed some loose stools.  Frequency has increased from 1-3 bowel movements per day.  There is no diarrhea and there is no bleeding.  He says that with the soft stools, he feels that he is BMs are incomplete.    He does not get COVID or flu shots.  He has chronic shoulder, elbow, and knee pain.  He cannot recall the timing of this, but thinks that it started around the same time as his IBD diagnosis.  He has not in rheumatology.    Labs from 5/9/24:  Cr 1.23, LFTs normal, Hgb 14.3, MCV 87.9, Plt 234    Johnathon reports the following symptoms over the last 7 days:    Stool Frequency normal   Average stools per day: 1   Average liquid stools per day: 0   Consistency of bowel: formed   Awakening from sleep to move bowels? No   Urgency no fecal urgency   Visible blood in stool? none   Abdominal pain? none   General Wellbeing? generally well     Johnathon also reports the following symptoms in the last month:    Leakage of stool while sleeping? No   Leakage of stool while awake? No       REVIEW OF SYSTEMS:  During the last 7 days, Johnathon experienced the following symptoms:  Unintentional Weight Loss (in the last month) No   Fever No   Eye irritation No   Mouth sores No   Sore throat No   Chest pain No   Shortness of breath No   Numbness or tingling in hands or feet No   Skin rash Yes  Comment:dry   Pain or swelling in joints Yes  Comment:elbow tendonitis   Bruising or bleeding No   Felt depressed or blue No   Fatigue No   Dysuria No   Please see HPI for additional pertinent review of systems; otherwise remainder of ROS was unremarkable    MEDICATIONS:    Current Outpatient Medications:     Adalimumab 40 MG/0.4ML PNKT    Humira, 2 Pen, 40 MG/0.4ML  "PNKT    meloxicam (MOBIC) 15 mg tablet    tadalafil (CIALIS) 5 MG tablet    omeprazole (PriLOSEC) 40 MG capsule    ALLERGIES:  Allergies   Allergen Reactions    Penicillins Rash       OBJECTIVE:  /68   Ht 5' 10\" (1.778 m)   Wt 80.3 kg (177 lb)   BMI 25.40 kg/m²      PHYSICAL EXAM:    General Appearance:   Alert, cooperative, no distress   HEENT:   Normocephalic, atraumatic, anicteric.     Neck:  Supple, symmetrical, trachea midline   Lungs:   Clear to auscultation bilaterally; no rales, rhonchi or wheezing; respirations unlabored    Heart::   Regular rate and rhythm; no murmur, rub, or gallop.   Abdomen:   Soft, non-distended; normal bowel sounds    Abdominal exam was notable for no tenderness with no mass.     Genitalia:   Deferred    Rectal:   Perirectal assessment was not performed.                     Extremities:  No cyanosis, clubbing or edema    Pulses:  2+ and symmetric    Skin:  No jaundice, rashes, or lesions    Lymph nodes:  No palpable cervical lymphadenopathy      No results found for: \"WBC\", \"HGB\", \"HCT\", \"MCV\", \"PLT\"  No results found for: \"NA\", \"SODIUM\", \"K\", \"CL\", \"CO2\", \"ANIONGAP\", \"AGAP\", \"BUN\", \"CREATININE\", \"GLUC\", \"GLUF\", \"CALCIUM\", \"AST\", \"ALT\", \"ALKPHOS\", \"PROT\", \"TP\", \"BILITOT\", \"TBILI\", \"EGFR\"  No results found for: \"CRP\"  No results found for: \"QIPRWIJF17\"  No results found for: \"FERRITIN\"    ASSESSMENT AND PLAN:    Johnathon has a history of macroscopic extensive  and microscopic pancolitis  ulcerative colitis diagnosed in 2021. Current medical therapy is with adalimumab 40 mg every 2 weeks. My global assessment is that the clinical disease is currently quiescent.     The 6-point Terrazas score was 0 and the 9-point Terrazas score was 0.  The short CDAI was 44.      Johnathon Perera has segmental colitis and mild ileitis diagnosed in 2021, consistent with Crohn's versus UC with backwash ileitis.  He is in clinical remission on adalimumab.  He had a 7 mm adenoma in 2022 and is due for " colonoscopy next year.  He has history of basal cell carcinoma and follows up with dermatology.  Recent issues with dry skin.    1. Continue new adalimumab 40 mg every 2 weeks.  2. Schedule colonoscopy for early next year.  3. Continue regular follow-up with dermatology.  4. Check vitamin B12, iron, and vitamin D levels.  5. Update QuantiFERON gold and hepatitis B surface antigen.  6. We discussed vaccination recommendations as outlined below.    Return in 6 months.      Health Maintenance Recommendations:  Vaccines & Infections  COVID-19 vaccination and boosters are recommended. There is no evidence that the COVID-19 vaccine would cause an IBD flare.  Avoid live vaccines if on immunosuppressive therapy.  Yearly influenza vaccine (flu shot).  Pneumonia vaccines for patients on immunosuppression. These include Prevnar 20, followed by Pneumovax 23 at least 8 weeks later.  Shingrix vaccine (series of 2 injections) for al patients 65 and older. Patients on tofacitinib or upadacitinib should be vaccinated regardless of age.  If not immune to measles mumps or rubella, MMR vaccine is recommended. However, this is a live vaccine and should be given prior to immunosuppressive therapy.  HPV vaccination as per national guidelines.  Hepatitis A and B vaccinations if not previously vaccinated.  Testing for tuberculosis with QuantiFERON Gold blood test and/or chest xray prior to starting immunosuppressive medications, and then annually    Cancer screening  Dysplasia surveillance for colorectal cancer. Colonoscopy in all patients with extensive colitis (more than 1/3 of the colon involved) who had disease for at least 8 or more years.  Repeat colonoscopy approximately every 12-24 months.  In patients with concurrent primary sclerosing cholangitis, history of dysplasia, or family history of colon cancer, repeat colonoscopy annually.    Females: Pap smear annually for woman on immunosuppression.  Annual dermatologic/skin exam in all  patients with IBD, especially those on immunosuppression with thiopurines or MARY KAY inhibitors.    Miscellaneous  DEXA scan, once off steroids for 3 months  Depression screening recommended annually  Routine dental and ophthalmology examinations    Problem List Items Addressed This Visit    None  Visit Diagnoses       Crohn's disease of both small and large intestine without complication (HCC)    -  Primary    Relevant Orders    Hepatitis B surface antigen    Quantiferon TB Gold Plus Assay    Vitamin D 25 hydroxy    Vitamin B12    Ferritin    TIBC Panel (incl. Iron, TIBC, % Iron Saturation)    Colonoscopy    Colon adenoma        Relevant Orders    Colonoscopy            Deangelo Skelton MD

## 2024-09-20 DIAGNOSIS — K21.9 GASTROESOPHAGEAL REFLUX DISEASE, UNSPECIFIED WHETHER ESOPHAGITIS PRESENT: ICD-10-CM

## 2024-09-20 RX ORDER — OMEPRAZOLE 40 MG/1
40 CAPSULE, DELAYED RELEASE ORAL DAILY
Qty: 90 CAPSULE | Refills: 1 | Status: SHIPPED | OUTPATIENT
Start: 2024-09-20

## 2024-09-25 ENCOUNTER — ANESTHESIA EVENT (OUTPATIENT)
Dept: ANESTHESIOLOGY | Facility: HOSPITAL | Age: 59
End: 2024-09-25

## 2024-09-25 ENCOUNTER — TELEPHONE (OUTPATIENT)
Dept: GASTROENTEROLOGY | Facility: CLINIC | Age: 59
End: 2024-09-25

## 2024-09-25 ENCOUNTER — ANESTHESIA (OUTPATIENT)
Dept: ANESTHESIOLOGY | Facility: HOSPITAL | Age: 59
End: 2024-09-25

## 2024-09-25 NOTE — TELEPHONE ENCOUNTER
Patients GI provider:  Dr. Skelton    Number to return call: [phone]    Reason for call: Pt called stating he kept receiving messages regarding a colonoscopy that was scheduled for him on 10/9 (pt cx through automated system). Pt states he thought Dr. Skelton wanted to have him complete this next year? Please advise.     Scheduled procedure/appointment date if applicable:

## 2024-09-26 NOTE — TELEPHONE ENCOUNTER
Maybe there was some miscommunication at check out when he was scheduled for October. I found in the Office note with Dr Skelton states:      1.Continue new adalimumab 40 mg every 2 weeks.  2.Schedule colonoscopy for early next year.  3.Continue regular follow-up with dermatology.  4.Check vitamin B12, iron, and vitamin D levels.  5.Update QuantiFERON gold and hepatitis B surface antigen.  6.We discussed vaccination recommendations as outlined below.     Return in 6 months.

## 2024-12-22 DIAGNOSIS — Z00.6 ENCOUNTER FOR EXAMINATION FOR NORMAL COMPARISON OR CONTROL IN CLINICAL RESEARCH PROGRAM: ICD-10-CM

## 2025-01-25 ENCOUNTER — ANESTHESIA EVENT (OUTPATIENT)
Dept: ANESTHESIOLOGY | Facility: HOSPITAL | Age: 60
End: 2025-01-25

## 2025-01-25 ENCOUNTER — ANESTHESIA (OUTPATIENT)
Dept: ANESTHESIOLOGY | Facility: HOSPITAL | Age: 60
End: 2025-01-25

## 2025-02-26 ENCOUNTER — TELEPHONE (OUTPATIENT)
Age: 60
End: 2025-02-26

## 2025-02-26 NOTE — TELEPHONE ENCOUNTER
Confirming Upcoming Procedure: colonoscopy on 03/07/2025  Physician performing: Dr Skelton  [facility] of [facility]   Prep: miralax prep     Spoke with patient no question at the moment about prep

## 2025-03-07 ENCOUNTER — ANESTHESIA (OUTPATIENT)
Dept: GASTROENTEROLOGY | Facility: MEDICAL CENTER | Age: 60
End: 2025-03-07
Payer: COMMERCIAL

## 2025-03-07 ENCOUNTER — HOSPITAL ENCOUNTER (OUTPATIENT)
Dept: GASTROENTEROLOGY | Facility: MEDICAL CENTER | Age: 60
Setting detail: OUTPATIENT SURGERY
End: 2025-03-07
Payer: COMMERCIAL

## 2025-03-07 ENCOUNTER — ANESTHESIA EVENT (OUTPATIENT)
Dept: GASTROENTEROLOGY | Facility: MEDICAL CENTER | Age: 60
End: 2025-03-07
Payer: COMMERCIAL

## 2025-03-07 VITALS
BODY MASS INDEX: 25.4 KG/M2 | SYSTOLIC BLOOD PRESSURE: 120 MMHG | DIASTOLIC BLOOD PRESSURE: 79 MMHG | TEMPERATURE: 96.7 F | OXYGEN SATURATION: 100 % | RESPIRATION RATE: 18 BRPM | WEIGHT: 177 LBS | HEART RATE: 69 BPM

## 2025-03-07 DIAGNOSIS — K52.9 IBD (INFLAMMATORY BOWEL DISEASE): Primary | ICD-10-CM

## 2025-03-07 DIAGNOSIS — K50.80 CROHN'S DISEASE OF BOTH SMALL AND LARGE INTESTINE WITHOUT COMPLICATION (HCC): ICD-10-CM

## 2025-03-07 DIAGNOSIS — D12.6 COLON ADENOMA: ICD-10-CM

## 2025-03-07 PROBLEM — K50.90 CROHN'S DISEASE (HCC): Status: ACTIVE | Noted: 2024-05-21

## 2025-03-07 PROBLEM — J45.909 ASTHMA: Status: ACTIVE | Noted: 2025-03-07

## 2025-03-07 PROBLEM — K21.9 GASTROESOPHAGEAL REFLUX DISEASE: Status: ACTIVE | Noted: 2025-03-07

## 2025-03-07 PROCEDURE — 88305 TISSUE EXAM BY PATHOLOGIST: CPT | Performed by: PATHOLOGY

## 2025-03-07 PROCEDURE — 45380 COLONOSCOPY AND BIOPSY: CPT | Performed by: INTERNAL MEDICINE

## 2025-03-07 RX ORDER — PROPOFOL 10 MG/ML
INJECTION, EMULSION INTRAVENOUS AS NEEDED
Status: DISCONTINUED | OUTPATIENT
Start: 2025-03-07 | End: 2025-03-07

## 2025-03-07 RX ORDER — SODIUM CHLORIDE, SODIUM LACTATE, POTASSIUM CHLORIDE, CALCIUM CHLORIDE 600; 310; 30; 20 MG/100ML; MG/100ML; MG/100ML; MG/100ML
125 INJECTION, SOLUTION INTRAVENOUS CONTINUOUS
Status: DISCONTINUED | OUTPATIENT
Start: 2025-03-07 | End: 2025-03-11 | Stop reason: HOSPADM

## 2025-03-07 RX ADMIN — PROPOFOL 100 MG: 10 INJECTION, EMULSION INTRAVENOUS at 09:33

## 2025-03-07 RX ADMIN — SODIUM CHLORIDE, SODIUM LACTATE, POTASSIUM CHLORIDE, AND CALCIUM CHLORIDE 125 ML/HR: .6; .31; .03; .02 INJECTION, SOLUTION INTRAVENOUS at 09:02

## 2025-03-07 RX ADMIN — Medication 40 MG: at 09:50

## 2025-03-07 RX ADMIN — PROPOFOL 30 MG: 10 INJECTION, EMULSION INTRAVENOUS at 09:45

## 2025-03-07 RX ADMIN — PROPOFOL 40 MG: 10 INJECTION, EMULSION INTRAVENOUS at 09:37

## 2025-03-07 RX ADMIN — PROPOFOL 100 MG: 10 INJECTION, EMULSION INTRAVENOUS at 09:28

## 2025-03-07 NOTE — ANESTHESIA PREPROCEDURE EVALUATION
"Procedure:  COLONOSCOPY    Relevant Problems   ANESTHESIA (within normal limits)      CARDIO (within normal limits)      ENDO (within normal limits)      GI/HEPATIC   (+) Gastroesophageal reflux disease      /RENAL (within normal limits)      HEMATOLOGY (within normal limits)      NEURO/PSYCH (within normal limits)      PULMONARY   (+) Asthma      Other   (+) IBD (inflammatory bowel disease)      No results found for: \"WBC\", \"HGB\", \"HCT\", \"MCV\", \"PLT\"  No results found for: \"SODIUM\", \"K\", \"CL\", \"CO2\", \"BUN\", \"CREATININE\", \"GLUC\", \"CALCIUM\"  No results found for: \"INR\", \"PROTIME\"  Lab Results   Component Value Date    HGBA1C 5.0 04/24/2023          Physical Exam    Airway    Mallampati score: II  TM Distance: >3 FB  Neck ROM: full     Dental   No notable dental hx     Cardiovascular  Cardiovascular exam normal    Pulmonary  Pulmonary exam normal     Other Findings        Anesthesia Plan  ASA Score- 2     Anesthesia Type- IV sedation with anesthesia with ASA Monitors.         Additional Monitors:     Airway Plan:            Plan Factors-Exercise tolerance (METS): >4 METS.    Chart reviewed.    Patient summary reviewed.                  Induction- intravenous.    Postoperative Plan-         Informed Consent- Anesthetic plan and risks discussed with patient.  I personally reviewed this patient with the CRNA. Discussed and agreed on the Anesthesia Plan with the CRNA..      NPO Status:  No vitals data found for the desired time range.        "

## 2025-03-07 NOTE — H&P
History and Physical - [facility]  Johnathon Perera 59 y.o. male MRN: [id number]                  HPI: Johnathon Perera is a 59 y.o. year old male who presents for ulcerative colitis and history of adenoma.      REVIEW OF SYSTEMS: Per the HPI, and otherwise unremarkable.    Historical Information   Past Medical History:   Diagnosis Date    Basal cell adenocarcinoma     Colon polyp     Crohn's disease (HCC) 10/22/2022    IBD (inflammatory bowel disease) 05/21/2024    Irritable bowel syndrome     Staphylococcal infection      Past Surgical History:   Procedure Laterality Date    COLONOSCOPY      KNEE ARTHROPLASTY Right 2018    MOHS SURGERY       Social History   Social History     Substance and Sexual Activity   Alcohol Use Yes    Alcohol/week: 4.0 standard drinks of alcohol    Types: 4 Standard drinks or equivalent per week    Comment: 4-5 drinks per week     Social History     Substance and Sexual Activity   Drug Use Never     Social History     Tobacco Use   Smoking Status Never   Smokeless Tobacco Never     Family History   Problem Relation Age of Onset    Diabetes Mother     COPD Father     Crohn's disease Maternal Grandmother         Dortone       Meds/Allergies     Not in a hospital admission.    Allergies   Allergen Reactions    Penicillins Rash       Objective     Blood pressure 131/61, pulse 56, temperature (!) 96.7 °F (35.9 °C), temperature source Temporal, resp. rate 16, weight 80.3 kg (177 lb), SpO2 99%.      PHYSICAL EXAMINATION:    General Appearance:   Alert, cooperative, no distress   HEENT:  Normocephalic, atraumatic, anicteric. Neck supple, symmetrical, trachea midline.   Lungs:   Equal chest rise and unlabored breathing, normal effort, no coughing.   Cardiovascular:   No visualized JVD.   Abdomen:   No abdominal distension.   Skin:   No jaundice, rashes, or lesions.    Musculoskeletal:   Normal range of motion visualized.   Psych:  Normal affect and normal insight.   Neuro:   Alert and appropriate.           ASSESSMENT/PLAN:  This is a 59 y.o. year old male here for colonoscopy, and he is stable and optimized for his procedure.

## 2025-03-13 ENCOUNTER — RESULTS FOLLOW-UP (OUTPATIENT)
Age: 60
End: 2025-03-13

## 2025-07-15 ENCOUNTER — TELEPHONE (OUTPATIENT)
Age: 60
End: 2025-07-15

## 2025-07-27 DIAGNOSIS — K50.80 CROHN'S DISEASE OF BOTH SMALL AND LARGE INTESTINE WITHOUT COMPLICATION (HCC): Primary | ICD-10-CM

## 2025-07-29 RX ORDER — ADALIMUMAB-RYVK 40MG/0.4ML
KIT SUBCUTANEOUS
Qty: 2 EACH | Refills: 3 | Status: SHIPPED | OUTPATIENT
Start: 2025-07-29